# Patient Record
Sex: MALE | NOT HISPANIC OR LATINO | URBAN - METROPOLITAN AREA
[De-identification: names, ages, dates, MRNs, and addresses within clinical notes are randomized per-mention and may not be internally consistent; named-entity substitution may affect disease eponyms.]

---

## 2023-10-06 ENCOUNTER — TELEPHONE (OUTPATIENT)
Dept: BEHAVIORAL HEALTH | Facility: CLINIC | Age: 33
End: 2023-10-06

## 2023-10-06 DIAGNOSIS — F90.0 ATTENTION DEFICIT HYPERACTIVITY DISORDER (ADHD), PREDOMINANTLY INATTENTIVE TYPE: ICD-10-CM

## 2023-10-06 RX ORDER — DEXTROAMPHETAMINE SACCHARATE, AMPHETAMINE ASPARTATE, DEXTROAMPHETAMINE SULFATE AND AMPHETAMINE SULFATE 2.5; 2.5; 2.5; 2.5 MG/1; MG/1; MG/1; MG/1
10 TABLET ORAL DAILY
COMMUNITY
Start: 2023-08-29 | End: 2023-10-06 | Stop reason: SDUPTHER

## 2023-10-06 RX ORDER — DEXTROAMPHETAMINE SACCHARATE, AMPHETAMINE ASPARTATE, DEXTROAMPHETAMINE SULFATE AND AMPHETAMINE SULFATE 2.5; 2.5; 2.5; 2.5 MG/1; MG/1; MG/1; MG/1
10 TABLET ORAL DAILY
Qty: 30 TABLET | Refills: 0 | Status: SHIPPED | OUTPATIENT
Start: 2023-10-06 | End: 2023-10-30

## 2023-10-12 PROBLEM — F98.8 ADD (ATTENTION DEFICIT DISORDER): Status: ACTIVE | Noted: 2023-10-12

## 2023-10-12 PROBLEM — G47.19 EXCESSIVE DAYTIME SLEEPINESS: Status: ACTIVE | Noted: 2023-10-12

## 2023-10-12 RX ORDER — MAGNESIUM GLUCONATE 27.5 (500)
1 TABLET ORAL DAILY
COMMUNITY
Start: 2022-07-11

## 2023-10-12 RX ORDER — ARGININE 500 MG
CAPSULE ORAL
COMMUNITY
Start: 2022-07-11

## 2023-10-13 ENCOUNTER — APPOINTMENT (OUTPATIENT)
Dept: BEHAVIORAL HEALTH | Facility: CLINIC | Age: 33
End: 2023-10-13

## 2023-10-24 ENCOUNTER — APPOINTMENT (OUTPATIENT)
Dept: BEHAVIORAL HEALTH | Facility: CLINIC | Age: 33
End: 2023-10-24

## 2023-10-30 ENCOUNTER — TELEMEDICINE (OUTPATIENT)
Dept: BEHAVIORAL HEALTH | Facility: CLINIC | Age: 33
End: 2023-10-30

## 2023-10-30 DIAGNOSIS — F90.0 ATTENTION DEFICIT HYPERACTIVITY DISORDER (ADHD), PREDOMINANTLY INATTENTIVE TYPE: ICD-10-CM

## 2023-10-30 PROCEDURE — 99213 OFFICE O/P EST LOW 20 MIN: CPT

## 2023-10-30 RX ORDER — MODAFINIL 200 MG/1
200 TABLET ORAL DAILY
Qty: 30 TABLET | Refills: 0 | Status: SHIPPED | OUTPATIENT
Start: 2023-10-30 | End: 2023-11-29

## 2023-10-30 ASSESSMENT — ENCOUNTER SYMPTOMS
GASTROINTESTINAL NEGATIVE: 1
MUSCULOSKELETAL NEGATIVE: 1
RESPIRATORY NEGATIVE: 1
NEUROLOGICAL NEGATIVE: 1
ALLERGIC/IMMUNOLOGIC NEGATIVE: 1
HEMATOLOGIC/LYMPHATIC NEGATIVE: 1
CONSTITUTIONAL NEGATIVE: 1
PSYCHIATRIC NEGATIVE: 1
ENDOCRINE NEGATIVE: 1
CARDIOVASCULAR NEGATIVE: 1
EYES NEGATIVE: 1

## 2023-10-30 NOTE — PROGRESS NOTES
Subjective   Zelalem Wilson is an 31 y/o [place] descendant who has a hx of ADHD.    HPI  Zelalem feels that adderall effects his mood. He states the medication can amplify feeling of anxiety and when he is angry he feels those feelings last longer than usual. But he feels the medication is useful with his ADHD symptoms. He also states that daytime sleepiness has been more excessive than his normal as a result he would like to return to modafinil 200 mg.            Mood:  Sleep/Energy/Motivation: 6hrs/high/moderate  Appetite/Weight Changes: decent/stable  Psychosis: childhood  SI/HI:denies      Past psych meds:   Adderall 20 mg- effective  xanax mg- hated the medication  modafinil 200 mg- concentrated better but still struggle with procrastination, poor organization          Review of Systems   Constitutional: Negative.    HENT: Negative.     Eyes: Negative.    Respiratory: Negative.     Cardiovascular: Negative.    Gastrointestinal: Negative.    Endocrine: Negative.    Genitourinary: Negative.    Musculoskeletal: Negative.    Skin: Negative.    Allergic/Immunologic: Negative.    Neurological: Negative.    Hematological: Negative.    Psychiatric/Behavioral: Negative.         Objective     Reviewed OARRS, no discrepancies or concerns. Discussed risk of insomnia, anxiety, agitation, anorexia, autonomic effects, toxicity, psychosis, dependence, tolerance, abuse.       Zelalem Wilson is a 31 y/o M who is experiencing side effects of Adderall 10 mg and day time sleepiness therefore he is now willing to trial modafinil 200 mg.    Risk: Pt is currently a low risk of suicide and self harm due to no past suicide attempts and not currently endorsing thoughts of suicide. The pt is currently low risk of violence and harm to others due to no past history of violence and not currently threatening others.     Assessment/Plan   Initiate modafinil 200 mg to target ADHD symptoms.  Advise to seek a therapist to minimize or/and resolve  attention deficit symptoms.    Advised to call [phone] to report any urgent concerns and/or schedule a sooner follow-up.     Provided with crisis/emergency resources, including Mobile Crisis [phone], Crisis Text Line (text 0CRVN yp 906016) and the National Suicide Prevention Lifeline hotline 1-136.102.9507. Agrees to call 911 or go to the nearest emergency department if s/he feels unsafe, or has suicidal thinking with a plan or intent.     Follow up in 8 weeks.

## 2024-06-28 ENCOUNTER — TELEPHONE (OUTPATIENT)
Dept: BEHAVIORAL HEALTH | Facility: CLINIC | Age: 34
End: 2024-06-28

## 2024-06-28 DIAGNOSIS — F90.0 ATTENTION DEFICIT HYPERACTIVITY DISORDER (ADHD), PREDOMINANTLY INATTENTIVE TYPE: ICD-10-CM

## 2024-06-28 RX ORDER — MODAFINIL 200 MG/1
200 TABLET ORAL DAILY
Qty: 30 TABLET | Refills: 0 | Status: CANCELLED | OUTPATIENT
Start: 2024-06-28 | End: 2024-07-28

## 2024-06-28 RX ORDER — MODAFINIL 200 MG/1
200 TABLET ORAL DAILY
Qty: 30 TABLET | Refills: 0 | Status: SHIPPED | OUTPATIENT
Start: 2024-06-28 | End: 2024-07-28

## 2024-07-22 ENCOUNTER — APPOINTMENT (OUTPATIENT)
Dept: BEHAVIORAL HEALTH | Facility: CLINIC | Age: 34
End: 2024-07-22

## 2024-07-22 DIAGNOSIS — F90.0 ATTENTION DEFICIT HYPERACTIVITY DISORDER (ADHD), PREDOMINANTLY INATTENTIVE TYPE: ICD-10-CM

## 2024-07-22 PROCEDURE — 99212 OFFICE O/P EST SF 10 MIN: CPT

## 2024-07-22 RX ORDER — DEXTROAMPHETAMINE SACCHARATE, AMPHETAMINE ASPARTATE, DEXTROAMPHETAMINE SULFATE AND AMPHETAMINE SULFATE 2.5; 2.5; 2.5; 2.5 MG/1; MG/1; MG/1; MG/1
10 TABLET ORAL DAILY
Qty: 30 TABLET | Refills: 0 | Status: SHIPPED | OUTPATIENT
Start: 2024-08-21 | End: 2024-09-20

## 2024-07-22 RX ORDER — DEXTROAMPHETAMINE SACCHARATE, AMPHETAMINE ASPARTATE, DEXTROAMPHETAMINE SULFATE AND AMPHETAMINE SULFATE 2.5; 2.5; 2.5; 2.5 MG/1; MG/1; MG/1; MG/1
10 TABLET ORAL DAILY
Qty: 30 TABLET | Refills: 0 | Status: SHIPPED | OUTPATIENT
Start: 2024-07-22 | End: 2024-08-21

## 2024-07-22 RX ORDER — DEXTROAMPHETAMINE SACCHARATE, AMPHETAMINE ASPARTATE, DEXTROAMPHETAMINE SULFATE AND AMPHETAMINE SULFATE 2.5; 2.5; 2.5; 2.5 MG/1; MG/1; MG/1; MG/1
10 TABLET ORAL DAILY
Qty: 30 TABLET | Refills: 0 | Status: SHIPPED | OUTPATIENT
Start: 2024-09-20 | End: 2024-10-20

## 2024-07-22 ASSESSMENT — ENCOUNTER SYMPTOMS
EYES NEGATIVE: 1
RESPIRATORY NEGATIVE: 1
CONSTITUTIONAL NEGATIVE: 1

## 2024-07-22 NOTE — PROGRESS NOTES
Subjective   Patient ID: Zelalem Wilson is a 33 y.o. male who presents for ADHD.     Virtual or Telephone Consent    An interactive audio and video telecommunication system which permits real time communications between the patient (at the originating site) and provider (at the distant site) was utilized to provide this telehealth service.   Verbal consent was requested and obtained from Zelalem Wilson on this date, 07/22/24 for a telehealth visit.      HPI  Since we last met he states that his mood remains stable and anxiety is manageable.  He states modafinil 200 mg helped with alertness but was ineffective for concentration,organization paying attention to details,and forgetfulness.    Review of Systems   Constitutional: Negative.    HENT: Negative.     Eyes: Negative.    Respiratory: Negative.           Objective   Physical Exam  Psychiatric:         Attention and Perception: Attention normal.         Mood and Affect: Mood normal.         Speech: Speech normal.         Behavior: Behavior is cooperative.         Thought Content: Thought content normal.         Cognition and Memory: Cognition normal.         Judgment: Judgment normal.         Reviewed OARRS, no discrepancies or concerns. Discussed risk of insomnia, anxiety, agitation, anorexia, autonomic effects, toxicity, psychosis, dependence, tolerance, abuse.         Risk: Pt is currently a low risk of suicide and self harm due to no past suicide attempts and not currently endorsing thoughts of suicide. The pt is currently low risk of violence and harm to others due to no past history of violence and not currently threatening others.        Assessment/Plan   Continue Adderall 10 mg to target ADHD symptoms.  Advise to seek a therapist to minimize or/and resolve attention deficit symptoms.     Advised to call [phone] to report any urgent concerns and/or schedule a sooner follow-up.      Provided with crisis/emergency resources, including Mobile Crisis  [phone], Crisis Text Line (text 0LFWQ to 888700) and the National Suicide Prevention Lifeline hotline 1-456.275.1107. Agrees to call 911 or go to the nearest emergency department if s/he feels unsafe, or has suicidal thinking with a plan or intent.      Follow up in 8 weeks.

## 2024-08-13 ENCOUNTER — OFFICE VISIT (OUTPATIENT)
Dept: FAMILY MEDICINE CLINIC | Facility: CLINIC | Age: 34
End: 2024-08-13
Payer: MEDICAID

## 2024-08-13 VITALS
HEART RATE: 82 BPM | WEIGHT: 196 LBS | TEMPERATURE: 97 F | OXYGEN SATURATION: 98 % | HEIGHT: 71 IN | BODY MASS INDEX: 27.44 KG/M2 | RESPIRATION RATE: 18 BRPM | SYSTOLIC BLOOD PRESSURE: 138 MMHG | DIASTOLIC BLOOD PRESSURE: 88 MMHG

## 2024-08-13 DIAGNOSIS — S30.861A INSECT BITE OF ABDOMINAL WALL, INITIAL ENCOUNTER: Primary | ICD-10-CM
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
## 2024-08-13 PROCEDURE — 99202 OFFICE O/P NEW SF 15 MIN: CPT | Performed by: NURSE PRACTITIONER

## 2024-08-13 NOTE — PROGRESS NOTES
CHIEF COMPLAINT:     Chief Complaint   Patient presents with    Rash          HPI:    John Orellana is a 33 year old male who presents for evaluation of a rash.  Per patient rash started in the past week. He has one small bump on the left torso. He states it was initially slightly itchy but at this time is not pruritic. No pain, no drainage, has remained unchanged in size.       No current outpatient medications on file.     No current facility-administered medications for this visit.      No past medical history on file.   No past surgical history on file.   No family history on file.   Social History     Socioeconomic History    Marital status:          REVIEW OF SYSTEMS:   GENERAL: feels well otherwise, no fever, no chills.  SKIN: Per HPI. No edema. No ulcerations.  HEENT: Denies rhinorrhea, edema of the lips or swelling of throat.  CARDIOVASCULAR: Denies chest pains or palpitations.  LUNGS: Denies shortness of breath with exertion or rest. No cough or wheezing.  LYMPH: Denies enlargement of the lymph nodes.  NEURO: Denies abnormal sensation, tingling of the skin, or numbness.      EXAM:   /88   Pulse 82   Temp 97.2 °F (36.2 °C) (Temporal)   Resp 18   Ht 5' 11\" (1.803 m)   Wt 196 lb (88.9 kg)   SpO2 98%   BMI 27.34 kg/m²   GENERAL: well developed, well nourished,in no apparent distress  SKIN: 1 small pink papule noted to left torso. No surrounding erythema, no drainage. Has appearance of insect bite  LUNGS: Clear to auscultation bilaterally.  No wheezing, rhonchi, or rales.  No diminished breath sounds. No increased work of breathing.   CARDIO: RRR without murmur      ASSESSMENT AND PLAN:   John Orellana is a 33 year old male who presents for evaluation of a rash. Findings are consistent with:    ASSESSMENT:  Encounter Diagnosis   Name Primary?    Insect bite of abdominal wall, initial encounter Yes       PLAN:   Discussed comfort measures  Reassurance given  Advised to follow up for any new or  worsening symptoms    Meds & Refills for this Visit:  Requested Prescriptions      No prescriptions requested or ordered in this encounter         Risk and benefits of medication discussed.     The patient indicates understanding of these issues and agrees to the plan.  The patient is asked to return in 3 days if sx persist or worsen

## 2024-10-09 ENCOUNTER — TELEPHONE (OUTPATIENT)
Dept: OTHER | Age: 34
End: 2024-10-09

## 2024-10-09 DIAGNOSIS — F90.0 ATTENTION DEFICIT HYPERACTIVITY DISORDER (ADHD), PREDOMINANTLY INATTENTIVE TYPE: ICD-10-CM

## 2024-10-09 RX ORDER — DEXTROAMPHETAMINE SACCHARATE, AMPHETAMINE ASPARTATE, DEXTROAMPHETAMINE SULFATE AND AMPHETAMINE SULFATE 2.5; 2.5; 2.5; 2.5 MG/1; MG/1; MG/1; MG/1
10 TABLET ORAL DAILY
Qty: 30 TABLET | Refills: 0 | Status: SHIPPED | OUTPATIENT
Start: 2024-10-09 | End: 2024-11-08

## 2024-10-09 NOTE — TELEPHONE ENCOUNTER
Caller: pt - starting to get low on medication, scheduled next visit    Medication:  Amphetamine dextroamphetamine 10 mgs    Pharmacy:  [facility], [phone]    Next visit: 10.28

## 2024-10-28 ENCOUNTER — APPOINTMENT (OUTPATIENT)
Dept: BEHAVIORAL HEALTH | Facility: CLINIC | Age: 34
End: 2024-10-28

## 2025-01-24 DIAGNOSIS — F90.0 ATTENTION DEFICIT HYPERACTIVITY DISORDER (ADHD), PREDOMINANTLY INATTENTIVE TYPE: ICD-10-CM

## 2025-01-24 RX ORDER — DEXTROAMPHETAMINE SACCHARATE, AMPHETAMINE ASPARTATE, DEXTROAMPHETAMINE SULFATE AND AMPHETAMINE SULFATE 2.5; 2.5; 2.5; 2.5 MG/1; MG/1; MG/1; MG/1
10 TABLET ORAL DAILY
Qty: 30 TABLET | Refills: 0 | Status: SHIPPED | OUTPATIENT
Start: 2025-01-24 | End: 2025-02-23